# Patient Record
Sex: MALE | Race: WHITE
[De-identification: names, ages, dates, MRNs, and addresses within clinical notes are randomized per-mention and may not be internally consistent; named-entity substitution may affect disease eponyms.]

---

## 2021-06-20 ENCOUNTER — HOSPITAL ENCOUNTER (EMERGENCY)
Dept: HOSPITAL 8 - ED | Age: 41
LOS: 1 days | Discharge: PSYCHIATRIC OR SUBSTANCE USE FACILITY | End: 2021-06-21
Payer: MEDICAID

## 2021-06-20 VITALS — BODY MASS INDEX: 21.26 KG/M2 | HEIGHT: 66 IN | WEIGHT: 132.28 LBS

## 2021-06-20 DIAGNOSIS — F48.2: ICD-10-CM

## 2021-06-20 DIAGNOSIS — F15.150: Primary | ICD-10-CM

## 2021-06-20 DIAGNOSIS — F20.0: ICD-10-CM

## 2021-06-20 LAB
ALBUMIN SERPL-MCNC: 3.9 G/DL (ref 3.4–5)
ALP SERPL-CCNC: 82 U/L (ref 45–117)
ALT SERPL-CCNC: 91 U/L (ref 12–78)
ANION GAP SERPL CALC-SCNC: 11 MMOL/L (ref 5–15)
BASOPHILS # BLD AUTO: 0.1 X10^3/UL (ref 0–0.1)
BASOPHILS NFR BLD AUTO: 0 % (ref 0–1)
BILIRUB SERPL-MCNC: 0.5 MG/DL (ref 0.2–1)
CALCIUM SERPL-MCNC: 9 MG/DL (ref 8.5–10.1)
CHLORIDE SERPL-SCNC: 106 MMOL/L (ref 98–107)
CREAT SERPL-MCNC: 0.98 MG/DL (ref 0.7–1.3)
EOSINOPHIL # BLD AUTO: 0 X10^3/UL (ref 0–0.4)
EOSINOPHIL NFR BLD AUTO: 0 % (ref 1–7)
ERYTHROCYTE [DISTWIDTH] IN BLOOD BY AUTOMATED COUNT: 14.2 % (ref 9.4–14.8)
LYMPHOCYTES # BLD AUTO: 2.3 X10^3/UL (ref 1–3.4)
LYMPHOCYTES NFR BLD AUTO: 15 % (ref 22–44)
MCH RBC QN AUTO: 32.6 PG (ref 27.5–34.5)
MCHC RBC AUTO-ENTMCNC: 33.7 G/DL (ref 33.2–36.2)
MONOCYTES # BLD AUTO: 1.5 X10^3/UL (ref 0.2–0.8)
MONOCYTES NFR BLD AUTO: 10 % (ref 2–9)
NEUTROPHILS # BLD AUTO: 11.9 X10^3/UL (ref 1.8–6.8)
NEUTROPHILS NFR BLD AUTO: 75 % (ref 42–75)
PLATELET # BLD AUTO: 656 X10^3/UL (ref 130–400)
PMV BLD AUTO: 7.2 FL (ref 7.4–10.4)
PROT SERPL-MCNC: 8.2 G/DL (ref 6.4–8.2)
RBC # BLD AUTO: 4.08 X10^6/UL (ref 4.38–5.82)
VANCOMYCIN TROUGH SERPL-MCNC: < 1.7 MG/DL (ref 2.8–20)

## 2021-06-20 PROCEDURE — 80299 QUANTITATIVE ASSAY DRUG: CPT

## 2021-06-20 PROCEDURE — 85025 COMPLETE CBC W/AUTO DIFF WBC: CPT

## 2021-06-20 PROCEDURE — 99285 EMERGENCY DEPT VISIT HI MDM: CPT

## 2021-06-20 PROCEDURE — G0480 DRUG TEST DEF 1-7 CLASSES: HCPCS

## 2021-06-20 PROCEDURE — 36415 COLL VENOUS BLD VENIPUNCTURE: CPT

## 2021-06-20 PROCEDURE — 80307 DRUG TEST PRSMV CHEM ANLYZR: CPT

## 2021-06-20 PROCEDURE — 80329 ANALGESICS NON-OPIOID 1 OR 2: CPT

## 2021-06-20 PROCEDURE — 80053 COMPREHEN METABOLIC PANEL: CPT

## 2021-06-20 PROCEDURE — 96372 THER/PROPH/DIAG INJ SC/IM: CPT

## 2021-06-20 PROCEDURE — 80320 DRUG SCREEN QUANTALCOHOLS: CPT

## 2021-06-20 NOTE — NUR
THIS RN WAS ABLE TO TALK PT INTO GETTING INTO A GOWN. PT PLACED ALL HIS CLOTHS 
INTO HIS BACKPACK AND GAVE THIS RN HIS SHOES. THIS RN PLACED SHOES INTO A 
PATIENT BELONGING BAG AND TOOK THAT BAG ALONG WITH HIS BACKPACK TO BE LOCKED IN 
THE CABINET IN TUB LABELLED ROOM 38. PT EATING A PEANUT BUTTER AND JELLY 
SANDWICH WITH MILK. SITTER AT BEDSIDE.
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
## 2021-06-21 VITALS — SYSTOLIC BLOOD PRESSURE: 106 MMHG | DIASTOLIC BLOOD PRESSURE: 69 MMHG

## 2021-06-21 NOTE — NUR
SPOKE WITH JASE AT [facility] DISPATCH, TRANSPORT TO [facility] ARRANGED FOR ETA OF 0900.

## 2021-06-21 NOTE — NUR
Report from Radha SETHI. Pt resting in bed with eyes closed, resp even and 
unlabored, NADN. Room is secured, sitter within eyesight of pt, all safety 
measures observed.

## 2021-06-21 NOTE — NUR
PT ACCEPTED BY DR. PATINO AND JOSE JUAN, RN @ [facility]. [facility] CONTACTED TO ARRANGE 
TRANSPORT, PT NOT ABLE TO BE LOCATED IN THEIR SYSTEM. TRANSFER REQUEST FAXED TO 
[facility].

## 2021-08-18 ENCOUNTER — HOSPITAL ENCOUNTER (INPATIENT)
Dept: HOSPITAL 8 - ED | Age: 41
LOS: 5 days | Discharge: LEFT BEFORE DISCHARGE | DRG: 557 | End: 2021-08-23
Attending: FAMILY MEDICINE | Admitting: INTERNAL MEDICINE
Payer: MEDICAID

## 2021-08-18 ENCOUNTER — HOSPITAL ENCOUNTER (EMERGENCY)
Dept: HOSPITAL 8 - ED | Age: 41
End: 2021-08-18
Attending: INTERNAL MEDICINE
Payer: MEDICAID

## 2021-08-18 VITALS — SYSTOLIC BLOOD PRESSURE: 99 MMHG | DIASTOLIC BLOOD PRESSURE: 60 MMHG

## 2021-08-18 VITALS — BODY MASS INDEX: 20.65 KG/M2 | WEIGHT: 147.49 LBS | HEIGHT: 71 IN

## 2021-08-18 VITALS — BODY MASS INDEX: 30.15 KG/M2 | WEIGHT: 215.39 LBS | HEIGHT: 71 IN

## 2021-08-18 DIAGNOSIS — E16.2: ICD-10-CM

## 2021-08-18 DIAGNOSIS — Z53.29: ICD-10-CM

## 2021-08-18 DIAGNOSIS — N17.0: ICD-10-CM

## 2021-08-18 DIAGNOSIS — F22: ICD-10-CM

## 2021-08-18 DIAGNOSIS — E87.2: ICD-10-CM

## 2021-08-18 DIAGNOSIS — F17.200: ICD-10-CM

## 2021-08-18 DIAGNOSIS — E87.5: ICD-10-CM

## 2021-08-18 DIAGNOSIS — F41.1: ICD-10-CM

## 2021-08-18 DIAGNOSIS — R56.9: ICD-10-CM

## 2021-08-18 DIAGNOSIS — F15.10: ICD-10-CM

## 2021-08-18 DIAGNOSIS — N17.9: ICD-10-CM

## 2021-08-18 DIAGNOSIS — F10.10: ICD-10-CM

## 2021-08-18 DIAGNOSIS — M62.82: ICD-10-CM

## 2021-08-18 DIAGNOSIS — F19.10: ICD-10-CM

## 2021-08-18 DIAGNOSIS — E87.5: Primary | ICD-10-CM

## 2021-08-18 DIAGNOSIS — T68.XXXA: ICD-10-CM

## 2021-08-18 DIAGNOSIS — R68.0: ICD-10-CM

## 2021-08-18 DIAGNOSIS — M62.82: Primary | ICD-10-CM

## 2021-08-18 LAB
<PLATELET ESTIMATE>: ADEQUATE
<PLT MORPHOLOGY>: (no result)
ANION GAP SERPL CALC-SCNC: 19 MMOL/L (ref 5–15)
BAND#(MANUAL): 1.61 X10^3/UL
BILIRUB DIRECT SERPL-MCNC: NORMAL MG/DL
CALCIUM SERPL-MCNC: 9.8 MG/DL (ref 8.5–10.1)
CHLORIDE SERPL-SCNC: 103 MMOL/L (ref 98–107)
CK SERPL-CCNC: (no result) U/L (ref 39–308)
CREAT SERPL-MCNC: 2.29 MG/DL (ref 0.7–1.3)
ERYTHROCYTE [DISTWIDTH] IN BLOOD BY AUTOMATED COUNT: 13.6 % (ref 9.4–14.8)
LYMPH#(MANUAL): 1.93 X10^3/UL (ref 1–3.4)
LYMPHS% (MANUAL): 6 % (ref 22–44)
MCH RBC QN AUTO: 32.5 PG (ref 27.5–34.5)
MCHC RBC AUTO-ENTMCNC: 33.7 G/DL (ref 33.2–36.2)
METAMYELOCYTES# (MANUAL): 0.32 X10^3/UL (ref 0–0)
METAMYELOCYTES% (MANUAL): 1 % (ref 0–1)
MICROSCOPIC: (no result)
MONOS#(MANUAL): 3.53 X10^3/UL (ref 0.3–2.7)
MONOS% (MANUAL): 11 % (ref 2–9)
NEUTS BAND NFR BLD: 5 % (ref 0–7)
PLATELET # BLD AUTO: 349 X10^3/UL (ref 130–400)
PMNS WITH VACUOLES: (no result)
PMV BLD AUTO: 9 FL (ref 7.4–10.4)
RBC # BLD AUTO: 4.77 X10^6/UL (ref 4.38–5.82)
SEG#(MANUAL): 24.72 X10^3/UL (ref 1.8–6.8)
SEGS% (MANUAL): 77 % (ref 42–75)

## 2021-08-18 PROCEDURE — 85025 COMPLETE CBC W/AUTO DIFF WBC: CPT

## 2021-08-18 PROCEDURE — 93005 ELECTROCARDIOGRAM TRACING: CPT

## 2021-08-18 PROCEDURE — 82962 GLUCOSE BLOOD TEST: CPT

## 2021-08-18 PROCEDURE — 99285 EMERGENCY DEPT VISIT HI MDM: CPT

## 2021-08-18 PROCEDURE — 80307 DRUG TEST PRSMV CHEM ANLYZR: CPT

## 2021-08-18 PROCEDURE — 96374 THER/PROPH/DIAG INJ IV PUSH: CPT

## 2021-08-18 PROCEDURE — 96361 HYDRATE IV INFUSION ADD-ON: CPT

## 2021-08-18 PROCEDURE — 80320 DRUG SCREEN QUANTALCOHOLS: CPT

## 2021-08-18 PROCEDURE — 81001 URINALYSIS AUTO W/SCOPE: CPT

## 2021-08-18 PROCEDURE — 71045 X-RAY EXAM CHEST 1 VIEW: CPT

## 2021-08-18 PROCEDURE — 99284 EMERGENCY DEPT VISIT MOD MDM: CPT

## 2021-08-18 PROCEDURE — 80048 BASIC METABOLIC PNL TOTAL CA: CPT

## 2021-08-18 PROCEDURE — 36415 COLL VENOUS BLD VENIPUNCTURE: CPT

## 2021-08-18 PROCEDURE — 82550 ASSAY OF CK (CPK): CPT

## 2021-08-18 PROCEDURE — G0480 DRUG TEST DEF 1-7 CLASSES: HCPCS

## 2021-08-18 PROCEDURE — 83735 ASSAY OF MAGNESIUM: CPT

## 2021-08-18 PROCEDURE — 96365 THER/PROPH/DIAG IV INF INIT: CPT

## 2021-08-18 PROCEDURE — 96375 TX/PRO/DX INJ NEW DRUG ADDON: CPT

## 2021-08-18 PROCEDURE — 96366 THER/PROPH/DIAG IV INF ADDON: CPT

## 2021-08-18 RX ADMIN — SODIUM BICARBONATE SCH MLS/HR: 84 INJECTION, SOLUTION INTRAVENOUS at 22:21

## 2021-08-18 RX ADMIN — DEXTROSE AND SODIUM CHLORIDE SCH MLS/HR: 5; 900 INJECTION, SOLUTION INTRAVENOUS at 15:00

## 2021-08-18 RX ADMIN — DEXTROSE AND SODIUM CHLORIDE SCH MLS/HR: 5; 900 INJECTION, SOLUTION INTRAVENOUS at 21:40

## 2021-08-18 RX ADMIN — HEPARIN SODIUM SCH UNITS: 5000 INJECTION, SOLUTION INTRAVENOUS; SUBCUTANEOUS at 22:22

## 2021-08-18 RX ADMIN — GABAPENTIN PRN MG: 300 CAPSULE ORAL at 22:25

## 2021-08-18 NOTE — NUR
PT SITTING AT END OF GURNEY EATING.  PT DRANK 750MLS OF WATER.  IV INFUSING 
WITHOUT REDNESS/SWELLING.  ST PER MONITOR.  PT AWARE OF WAITING OF ADMIT ROOM. 
PT STATES.  "I DONT LIKE THIS ROOM, I GET CLAUSTROPHOBIC AND ITS JUST MY 
PERSONALITY TO JUST WALK OUT OF HERE"  ENC PT NOT TO DO THAT.  MD WANTS HIM TO 
STAY AND BE OBSERVED OVER NIGHT.  VERBALIZES UNDERSTANDING BUT AGAIN STATES 
"THATS JUST MY PERSONALITY"
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
## 2021-08-18 NOTE — NUR
DISCUSSED WITH DR LEVIN, PT PULLING OXYGEN OFF, PULLING AT BLANKETS.  "ITS 
HOT"  ORAL TEMP 98.5.  ST PER MONITOR.  PT USED URINAL 400CC BROWN URINE.  IV 
FLUIDS INFUSING AS ORDERED.

## 2021-08-18 NOTE — NUR
PT CONT TO COME OUT OF ROOM WANTING TO LEAVE.  DR LEVIN AWARE, IV IN RAC 
DC'D WITH CANNULA INTACT.  PT PROVIDED CLOTHING, PT LEFT AMB, GAIT STEADY.

## 2021-08-18 NOTE — NUR
RECIEVED REPORT FROM JOSSIE ELIZABETH. PATIENT RESTING ON GURNEY AWAITING TRASPORT 
UPSTAIRS. NAD AT THIS TIME

## 2021-08-18 NOTE — NUR
DISCUSSED WITH DR CHOUDHURY, PT ELEVATED WBC, POTENTIAL FOR SEPSIS.  PER MD, 
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
## 2021-08-18 NOTE — NUR
ASSISSTED PATIENT TO BATHROOM, PATINET AMBULATED WITH A STEADY GAIT, PATIENT 
ASSISTED BACK TO ROOM AND CONNECTED BACK TO MONITOR. NAD AT THIS TIME.

## 2021-08-18 NOTE — NUR
Dr Smith has been to bedside for eval



unable to get a pulse ox reading as pt is too cold at this time.  pt is awake 
and speaking.  answering questions and following commands.  denies SOB



pt has been found to be hypoglycemic and has been medicated



pt wet underwear removed and pt has been covered with waming blanket, 
additional warm blankets and RT at bedside to initiate heated humidified O2

## 2021-08-18 NOTE — NUR
REPORT RECEIVED, CARE ASSUMED.  PT DOZING INTERMITTENTLY, AROUSES EASILY.  
STATES "FEELING WARMER, NOT SHIVERING ANYMORE"  ST PER MONITOR.  ABBE HUGGER IN 
PLACE.  RECEIVING WARMED HUMIDIFIED OXYGEN.  NO NEEDS EXPRESSED AT THIS TIME.
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
## 2021-08-18 NOTE — NUR
URINE SPECIMAN FOR UA AND DRUGS OF ABUSE SENT TO LAB. PT CONT ST PER MONITOR.  
OXYGEN OFF.  IV FLUIDS INFUSING AS ORDERED.
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
## 2021-08-19 VITALS — DIASTOLIC BLOOD PRESSURE: 73 MMHG | SYSTOLIC BLOOD PRESSURE: 97 MMHG

## 2021-08-19 VITALS — SYSTOLIC BLOOD PRESSURE: 98 MMHG | DIASTOLIC BLOOD PRESSURE: 61 MMHG

## 2021-08-19 VITALS — SYSTOLIC BLOOD PRESSURE: 100 MMHG | DIASTOLIC BLOOD PRESSURE: 65 MMHG

## 2021-08-19 VITALS — SYSTOLIC BLOOD PRESSURE: 99 MMHG | DIASTOLIC BLOOD PRESSURE: 60 MMHG

## 2021-08-19 LAB
ANION GAP SERPL CALC-SCNC: 5 MMOL/L (ref 5–15)
CALCIUM SERPL-MCNC: 7.8 MG/DL (ref 8.5–10.1)
CHLORIDE SERPL-SCNC: 106 MMOL/L (ref 98–107)
CK SERPL-CCNC: (no result) U/L (ref 39–308)
CREAT SERPL-MCNC: 0.89 MG/DL (ref 0.7–1.3)

## 2021-08-19 RX ADMIN — HYDROCODONE BITARTRATE AND ACETAMINOPHEN PRN TAB: 5; 325 TABLET ORAL at 20:18

## 2021-08-19 RX ADMIN — GABAPENTIN PRN MG: 300 CAPSULE ORAL at 22:22

## 2021-08-19 RX ADMIN — HEPARIN SODIUM SCH UNITS: 5000 INJECTION, SOLUTION INTRAVENOUS; SUBCUTANEOUS at 05:55

## 2021-08-19 RX ADMIN — HEPARIN SODIUM SCH UNITS: 5000 INJECTION, SOLUTION INTRAVENOUS; SUBCUTANEOUS at 14:08

## 2021-08-19 RX ADMIN — SODIUM BICARBONATE SCH MLS/HR: 84 INJECTION, SOLUTION INTRAVENOUS at 05:12

## 2021-08-19 RX ADMIN — HYDROCODONE BITARTRATE AND ACETAMINOPHEN PRN TAB: 5; 325 TABLET ORAL at 09:58

## 2021-08-19 RX ADMIN — SODIUM BICARBONATE SCH MLS/HR: 84 INJECTION, SOLUTION INTRAVENOUS at 14:07

## 2021-08-19 RX ADMIN — SODIUM BICARBONATE SCH MLS/HR: 84 INJECTION, SOLUTION INTRAVENOUS at 20:19

## 2021-08-19 RX ADMIN — HEPARIN SODIUM SCH UNITS: 5000 INJECTION, SOLUTION INTRAVENOUS; SUBCUTANEOUS at 22:22

## 2021-08-20 VITALS — SYSTOLIC BLOOD PRESSURE: 98 MMHG | DIASTOLIC BLOOD PRESSURE: 69 MMHG

## 2021-08-20 VITALS — DIASTOLIC BLOOD PRESSURE: 83 MMHG | SYSTOLIC BLOOD PRESSURE: 121 MMHG

## 2021-08-20 VITALS — SYSTOLIC BLOOD PRESSURE: 108 MMHG | DIASTOLIC BLOOD PRESSURE: 70 MMHG

## 2021-08-20 VITALS — DIASTOLIC BLOOD PRESSURE: 77 MMHG | SYSTOLIC BLOOD PRESSURE: 114 MMHG

## 2021-08-20 LAB
ANION GAP SERPL CALC-SCNC: 2 MMOL/L (ref 5–15)
CALCIUM SERPL-MCNC: 8.4 MG/DL (ref 8.5–10.1)
CHLORIDE SERPL-SCNC: 103 MMOL/L (ref 98–107)
CK SERPL-CCNC: (no result) U/L (ref 39–308)
CREAT SERPL-MCNC: 0.89 MG/DL (ref 0.7–1.3)

## 2021-08-20 RX ADMIN — SODIUM BICARBONATE SCH MLS/HR: 84 INJECTION, SOLUTION INTRAVENOUS at 22:23

## 2021-08-20 RX ADMIN — HYDROCODONE BITARTRATE AND ACETAMINOPHEN PRN TAB: 5; 325 TABLET ORAL at 19:55

## 2021-08-20 RX ADMIN — HYDROCODONE BITARTRATE AND ACETAMINOPHEN PRN TAB: 5; 325 TABLET ORAL at 14:30

## 2021-08-20 RX ADMIN — HEPARIN SODIUM SCH UNITS: 5000 INJECTION, SOLUTION INTRAVENOUS; SUBCUTANEOUS at 22:22

## 2021-08-20 RX ADMIN — SODIUM BICARBONATE SCH MLS/HR: 84 INJECTION, SOLUTION INTRAVENOUS at 10:19

## 2021-08-20 RX ADMIN — HEPARIN SODIUM SCH UNITS: 5000 INJECTION, SOLUTION INTRAVENOUS; SUBCUTANEOUS at 14:21

## 2021-08-20 RX ADMIN — HEPARIN SODIUM SCH UNITS: 5000 INJECTION, SOLUTION INTRAVENOUS; SUBCUTANEOUS at 05:57

## 2021-08-20 RX ADMIN — SODIUM BICARBONATE SCH MLS/HR: 84 INJECTION, SOLUTION INTRAVENOUS at 04:54

## 2021-08-20 RX ADMIN — HYDROCODONE BITARTRATE AND ACETAMINOPHEN PRN TAB: 5; 325 TABLET ORAL at 05:57

## 2021-08-20 RX ADMIN — SODIUM BICARBONATE SCH MLS/HR: 84 INJECTION, SOLUTION INTRAVENOUS at 16:14

## 2021-08-20 RX ADMIN — GABAPENTIN PRN MG: 300 CAPSULE ORAL at 22:23

## 2021-08-21 VITALS — SYSTOLIC BLOOD PRESSURE: 112 MMHG | DIASTOLIC BLOOD PRESSURE: 78 MMHG

## 2021-08-21 VITALS — SYSTOLIC BLOOD PRESSURE: 134 MMHG | DIASTOLIC BLOOD PRESSURE: 96 MMHG

## 2021-08-21 VITALS — DIASTOLIC BLOOD PRESSURE: 73 MMHG | SYSTOLIC BLOOD PRESSURE: 107 MMHG

## 2021-08-21 VITALS — SYSTOLIC BLOOD PRESSURE: 114 MMHG | DIASTOLIC BLOOD PRESSURE: 84 MMHG

## 2021-08-21 VITALS — SYSTOLIC BLOOD PRESSURE: 121 MMHG | DIASTOLIC BLOOD PRESSURE: 82 MMHG

## 2021-08-21 RX ADMIN — HEPARIN SODIUM SCH UNITS: 5000 INJECTION, SOLUTION INTRAVENOUS; SUBCUTANEOUS at 14:11

## 2021-08-21 RX ADMIN — GABAPENTIN PRN MG: 300 CAPSULE ORAL at 22:22

## 2021-08-21 RX ADMIN — SODIUM BICARBONATE SCH MLS/HR: 84 INJECTION, SOLUTION INTRAVENOUS at 10:45

## 2021-08-21 RX ADMIN — SODIUM BICARBONATE SCH MLS/HR: 84 INJECTION, SOLUTION INTRAVENOUS at 22:29

## 2021-08-21 RX ADMIN — SODIUM BICARBONATE SCH MLS/HR: 84 INJECTION, SOLUTION INTRAVENOUS at 05:08

## 2021-08-21 RX ADMIN — HYDROCODONE BITARTRATE AND ACETAMINOPHEN PRN TAB: 5; 325 TABLET ORAL at 10:45

## 2021-08-21 RX ADMIN — HEPARIN SODIUM SCH UNITS: 5000 INJECTION, SOLUTION INTRAVENOUS; SUBCUTANEOUS at 06:04

## 2021-08-21 RX ADMIN — SODIUM BICARBONATE SCH MLS/HR: 84 INJECTION, SOLUTION INTRAVENOUS at 16:56

## 2021-08-21 RX ADMIN — HYDROCODONE BITARTRATE AND ACETAMINOPHEN PRN TAB: 5; 325 TABLET ORAL at 06:05

## 2021-08-21 RX ADMIN — HYDROCODONE BITARTRATE AND ACETAMINOPHEN PRN TAB: 5; 325 TABLET ORAL at 20:39

## 2021-08-21 RX ADMIN — HEPARIN SODIUM SCH UNITS: 5000 INJECTION, SOLUTION INTRAVENOUS; SUBCUTANEOUS at 22:22

## 2021-08-22 VITALS — DIASTOLIC BLOOD PRESSURE: 74 MMHG | SYSTOLIC BLOOD PRESSURE: 110 MMHG

## 2021-08-22 VITALS — DIASTOLIC BLOOD PRESSURE: 66 MMHG | SYSTOLIC BLOOD PRESSURE: 119 MMHG

## 2021-08-22 VITALS — SYSTOLIC BLOOD PRESSURE: 125 MMHG | DIASTOLIC BLOOD PRESSURE: 80 MMHG

## 2021-08-22 VITALS — DIASTOLIC BLOOD PRESSURE: 82 MMHG | SYSTOLIC BLOOD PRESSURE: 122 MMHG

## 2021-08-22 RX ADMIN — HYDROCODONE BITARTRATE AND ACETAMINOPHEN PRN TAB: 5; 325 TABLET ORAL at 19:38

## 2021-08-22 RX ADMIN — HYDROCODONE BITARTRATE AND ACETAMINOPHEN PRN TAB: 5; 325 TABLET ORAL at 11:12

## 2021-08-22 RX ADMIN — HEPARIN SODIUM SCH UNITS: 5000 INJECTION, SOLUTION INTRAVENOUS; SUBCUTANEOUS at 21:57

## 2021-08-22 RX ADMIN — SODIUM BICARBONATE SCH MLS/HR: 84 INJECTION, SOLUTION INTRAVENOUS at 05:27

## 2021-08-22 RX ADMIN — HEPARIN SODIUM SCH UNITS: 5000 INJECTION, SOLUTION INTRAVENOUS; SUBCUTANEOUS at 14:25

## 2021-08-22 RX ADMIN — SODIUM BICARBONATE SCH MLS/HR: 84 INJECTION, SOLUTION INTRAVENOUS at 11:11

## 2021-08-22 RX ADMIN — SODIUM BICARBONATE SCH MLS/HR: 84 INJECTION, SOLUTION INTRAVENOUS at 16:57

## 2021-08-22 RX ADMIN — HYDROCODONE BITARTRATE AND ACETAMINOPHEN PRN TAB: 5; 325 TABLET ORAL at 05:28

## 2021-08-22 RX ADMIN — HEPARIN SODIUM SCH UNITS: 5000 INJECTION, SOLUTION INTRAVENOUS; SUBCUTANEOUS at 05:28

## 2021-08-23 VITALS — DIASTOLIC BLOOD PRESSURE: 84 MMHG | SYSTOLIC BLOOD PRESSURE: 124 MMHG

## 2021-08-23 VITALS — DIASTOLIC BLOOD PRESSURE: 80 MMHG | SYSTOLIC BLOOD PRESSURE: 130 MMHG

## 2021-08-23 VITALS — SYSTOLIC BLOOD PRESSURE: 136 MMHG | DIASTOLIC BLOOD PRESSURE: 94 MMHG

## 2021-08-23 RX ADMIN — SODIUM BICARBONATE SCH MLS/HR: 84 INJECTION, SOLUTION INTRAVENOUS at 13:08

## 2021-08-23 RX ADMIN — SODIUM BICARBONATE SCH MLS/HR: 84 INJECTION, SOLUTION INTRAVENOUS at 00:44

## 2021-08-23 RX ADMIN — HEPARIN SODIUM SCH UNITS: 5000 INJECTION, SOLUTION INTRAVENOUS; SUBCUTANEOUS at 13:35

## 2021-08-23 RX ADMIN — SODIUM BICARBONATE SCH MLS/HR: 84 INJECTION, SOLUTION INTRAVENOUS at 06:16

## 2021-08-23 RX ADMIN — HYDROCODONE BITARTRATE AND ACETAMINOPHEN PRN TAB: 5; 325 TABLET ORAL at 13:12

## 2021-08-23 RX ADMIN — HEPARIN SODIUM SCH UNITS: 5000 INJECTION, SOLUTION INTRAVENOUS; SUBCUTANEOUS at 06:16
